# Patient Record
(demographics unavailable — no encounter records)

---

## 2024-10-28 NOTE — REVIEW OF SYSTEMS
[Ear Pain] : ear pain [Nasal Congestion] : nasal congestion [Negative] : Genitourinary [Headache] : no headache [Sore Throat] : no sore throat

## 2024-10-28 NOTE — DISCUSSION/SUMMARY
[FreeTextEntry1] : 8 yo male with nasal congestion, left otalgia.  Saline rinse for nose, humidifier, Vaseline or nasal saline gel PRN.  Humidifier.

## 2024-10-28 NOTE — HISTORY OF PRESENT ILLNESS
[de-identified] : nasal congestion, ear pain [FreeTextEntry6] : Since Saturday c/o left ear pain 9/10 at its worst.  No ear pain now.  Runny and stuffy nose on and off a few weeks, dried mucus in nostrils.  No cough.  Nl po, nl activity, no fever.  Many sick contacts.  No fever, body aches or chills.  No fatigue.  No HA,  no ST, no cough, no SOB or chest pain.  No SA, no N/V/D.  Nl po, nl sleep.  No rash.

## 2024-11-29 NOTE — HISTORY OF PRESENT ILLNESS
[de-identified] : Fever/Sore throat/Congestion [FreeTextEntry6] : Started yesterday with increased fatigue and sore throat and HA. Overnight he felt warm- mother has given Tylenol and Motrin. NL sleep and decreased appetite. Still with increased sore throat. Has stuffy and runny nose.  No ear pain or pressure. Hacky and phlegmy cough- feels it in his throat.  No CP/SOB. No SA/V/D/C/loose stools. Ate better this AM.  No one else sick at home.  Possible sick contacts at school.

## 2024-11-29 NOTE — REVIEW OF SYSTEMS
[Fever] : fever [Malaise] : malaise [Nasal Discharge] : nasal discharge [Nasal Congestion] : nasal congestion [Sore Throat] : sore throat [Cough] : cough [Appetite Changes] : appetite changes [Negative] : Skin